# Patient Record
Sex: FEMALE | Race: WHITE | NOT HISPANIC OR LATINO | URBAN - METROPOLITAN AREA
[De-identification: names, ages, dates, MRNs, and addresses within clinical notes are randomized per-mention and may not be internally consistent; named-entity substitution may affect disease eponyms.]

---
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
## 2025-06-11 NOTE — ASSESSMENT & PLAN NOTE
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 No breast or gyn concerns.  Normal breast exam, pelvic deferred.   Pap due next time  Continues with annual surveillance with neurofibromatosis specialist at [facility].    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   Consistent condom use reviewed if needed.

## 2025-06-13 ENCOUNTER — ANNUAL EXAM (OUTPATIENT)
Dept: OBGYN CLINIC | Facility: CLINIC | Age: 20
End: 2025-06-13
Payer: COMMERCIAL

## 2025-06-13 VITALS
WEIGHT: 148 LBS | SYSTOLIC BLOOD PRESSURE: 90 MMHG | HEIGHT: 60 IN | DIASTOLIC BLOOD PRESSURE: 66 MMHG | BODY MASS INDEX: 29.06 KG/M2

## 2025-06-13 DIAGNOSIS — Z01.419 ENCOUNTER FOR GYNECOLOGICAL EXAMINATION (GENERAL) (ROUTINE) WITHOUT ABNORMAL FINDINGS: Primary | ICD-10-CM

## 2025-06-13 PROCEDURE — S0612 ANNUAL GYNECOLOGICAL EXAMINA: HCPCS | Performed by: OBSTETRICS & GYNECOLOGY

## 2025-06-13 RX ORDER — LORATADINE 10 MG/1
10 TABLET ORAL DAILY PRN
COMMUNITY

## 2025-06-13 NOTE — LETTER
2025     Sharyn Navarro PA-C  [address] [place] [id number]    Patient: Bailey Gamble   YOB: 2005   Date of Visit: 2025       Dear Sharyn:    Bailey Gamble was in today for her annual gyn exam. Below are my notes for this visit.    If you have questions, please do not hesitate to call me. I look forward to following your patient along with you.         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 No breast or gyn concerns.  Normal breast exam, pelvic deferred.   Pap due next time  Continues with annual surveillance with neurofibromatosis specialist at [facility].    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   Consistent condom use reviewed if needed.           History of Present Illness  HPI  Bailey Gamble is a 20 y.o. female who presents for well check.    Review of Systems  No menorrhagia, dysmenorrhea, intermenstrual bleeding  No breast, bladder, bowel changes. No new persistent pain.    Past Medical History  Past Medical History[1]  Past Surgical History[2]  Family History[3]   reports that she has never smoked. She has never used smokeless tobacco. She reports that she does not currently use alcohol. She reports that she does not use drugs.  Current Outpatient Medications  "  Medication Instructions   • loratadine (CLARITIN) 10 mg, Daily PRN   Allergies[4]      Objective  BP 90/66 (BP Location: Left arm, Patient Position: Sitting, Cuff Size: Standard)   Ht 5' 0.25\" (1.53 m)   Wt 67.1 kg (148 lb)   LMP 05/26/2025 (Approximate)   BMI 28.66 kg/m²      Physical Exam  General appearance: no distress, pleasant  Neck: thyroid without nodules or thyromegaly, no palpable adenopathy  Lymph nodes: no palpable adenopathy  Skin with multiple cafe au lait spots  Breasts: no masses, nodes or skin changes  Abdomen: soft, non tender, no palpable masses  Pelvic exam: deferred         [1]  Past Medical History:  Diagnosis Date   • Gardasil complete per pt    • Neurofibromatosis (HCC)    [2]  No past surgical history on file.  [3]  Family History  Problem Relation Name Age of Onset   • No Known Problems Mother Luci    • No Known Problems Father John    • No Known Problems Brother Vickey    • No Known Problems Maternal Grandmother Hilaria    • Pancreatic cancer Maternal Grandfather Arsalan    • Cancer Maternal Grandfather Arsalan    • Heart attack Paternal Grandfather Santos         over 50   • Breast cancer Neg Hx     • Uterine cancer Neg Hx     • Ovarian cancer Neg Hx     • Colon cancer Neg Hx     • Stroke Neg Hx     • Thrombosis Neg Hx     [4]  No Known Allergies  "